# Patient Record
Sex: MALE | Race: WHITE | Employment: OTHER | URBAN - NONMETROPOLITAN AREA
[De-identification: names, ages, dates, MRNs, and addresses within clinical notes are randomized per-mention and may not be internally consistent; named-entity substitution may affect disease eponyms.]

---

## 2020-11-25 ENCOUNTER — OFFICE VISIT (OUTPATIENT)
Dept: ENT CLINIC | Age: 75
End: 2020-11-25
Payer: MEDICARE

## 2020-11-25 VITALS
DIASTOLIC BLOOD PRESSURE: 82 MMHG | RESPIRATION RATE: 16 BRPM | SYSTOLIC BLOOD PRESSURE: 130 MMHG | HEART RATE: 80 BPM | WEIGHT: 203 LBS | TEMPERATURE: 97.3 F | BODY MASS INDEX: 32.62 KG/M2 | HEIGHT: 66 IN

## 2020-11-25 PROBLEM — R42 VERTIGO: Status: ACTIVE | Noted: 2020-11-25

## 2020-11-25 PROBLEM — Z95.1 PRESENCE OF AORTOCORONARY BYPASS GRAFT: Status: ACTIVE | Noted: 2017-10-20

## 2020-11-25 PROBLEM — E11.9 TYPE 2 DIABETES MELLITUS WITHOUT COMPLICATIONS (HCC): Status: ACTIVE | Noted: 2017-10-20

## 2020-11-25 PROBLEM — S01.93XA GUNSHOT WOUND OF HEAD: Status: ACTIVE | Noted: 2020-11-25

## 2020-11-25 PROBLEM — Z95.1 S/P CABG X 4: Status: ACTIVE | Noted: 2017-11-21

## 2020-11-25 PROBLEM — C44.91 BASAL CELL CARCINOMA OF SKIN: Status: ACTIVE | Noted: 2018-10-23
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
## 2020-11-25 PROBLEM — I21.19 ST ELEVATION MYOCARDIAL INFARCTION (STEMI) OF INFERIOR WALL, SUBSEQUENT EPISODE OF CARE (HCC): Status: ACTIVE | Noted: 2018-08-23

## 2020-11-25 PROBLEM — I25.10 CORONARY ARTERY DISEASE INVOLVING NATIVE CORONARY ARTERY OF NATIVE HEART WITHOUT ANGINA PECTORIS: Status: ACTIVE | Noted: 2017-10-02

## 2020-11-25 PROBLEM — I25.2 OLD INFERIOR WALL MYOCARDIAL INFARCTION: Status: ACTIVE | Noted: 2018-10-23

## 2020-11-25 PROBLEM — H81.90 VESTIBULOPATHY: Status: ACTIVE | Noted: 2020-11-25

## 2020-11-25 PROBLEM — Z77.122 EXPOSURE TO NOISE: Status: ACTIVE | Noted: 2020-11-25

## 2020-11-25 PROBLEM — H55.00 NYSTAGMUS: Status: ACTIVE | Noted: 2020-11-25

## 2020-11-25 PROBLEM — J44.9 OTHER SPECIFIED CHRONIC OBSTRUCTIVE AIRWAYS DISEASE: Status: ACTIVE | Noted: 2017-10-16

## 2020-11-25 PROBLEM — D64.9 ANEMIA: Status: ACTIVE | Noted: 2017-10-11

## 2020-11-25 PROBLEM — E66.9 OBESITY (BMI 30.0-34.9): Status: ACTIVE | Noted: 2017-10-02

## 2020-11-25 PROBLEM — R05.9 COUGH: Status: ACTIVE | Noted: 2017-10-12

## 2020-11-25 PROCEDURE — 1036F TOBACCO NON-USER: CPT | Performed by: OTOLARYNGOLOGY

## 2020-11-25 PROCEDURE — 4040F PNEUMOC VAC/ADMIN/RCVD: CPT | Performed by: OTOLARYNGOLOGY

## 2020-11-25 PROCEDURE — G8484 FLU IMMUNIZE NO ADMIN: HCPCS | Performed by: OTOLARYNGOLOGY

## 2020-11-25 PROCEDURE — 99203 OFFICE O/P NEW LOW 30 MIN: CPT | Performed by: OTOLARYNGOLOGY

## 2020-11-25 PROCEDURE — G8417 CALC BMI ABV UP PARAM F/U: HCPCS | Performed by: OTOLARYNGOLOGY

## 2020-11-25 PROCEDURE — 3017F COLORECTAL CA SCREEN DOC REV: CPT | Performed by: OTOLARYNGOLOGY

## 2020-11-25 PROCEDURE — 1123F ACP DISCUSS/DSCN MKR DOCD: CPT | Performed by: OTOLARYNGOLOGY

## 2020-11-25 PROCEDURE — G8427 DOCREV CUR MEDS BY ELIG CLIN: HCPCS | Performed by: OTOLARYNGOLOGY

## 2020-11-25 RX ORDER — LANCETS 33 GAUGE
EACH MISCELLANEOUS
COMMUNITY
Start: 2020-11-05

## 2020-11-25 RX ORDER — BLOOD SUGAR DIAGNOSTIC
STRIP MISCELLANEOUS
COMMUNITY
Start: 2020-11-05

## 2020-11-25 RX ORDER — NITROGLYCERIN 0.4 MG/1
0.4 TABLET SUBLINGUAL DAILY
COMMUNITY
Start: 2020-11-11

## 2020-11-25 RX ORDER — CLOPIDOGREL BISULFATE 75 MG/1
75 TABLET ORAL DAILY
COMMUNITY
Start: 2020-11-03

## 2020-11-25 RX ORDER — ASPIRIN 81 MG/1
81 TABLET, CHEWABLE ORAL DAILY
COMMUNITY

## 2020-11-25 RX ORDER — ATORVASTATIN CALCIUM 80 MG/1
80 TABLET, FILM COATED ORAL DAILY
COMMUNITY
Start: 2020-11-03
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
## 2020-12-25 PROBLEM — R05.9 COUGH: Status: RESOLVED | Noted: 2017-10-12 | Resolved: 2020-12-25

## 2020-12-29 ENCOUNTER — HOSPITAL ENCOUNTER (OUTPATIENT)
Dept: AUDIOLOGY | Age: 75
Discharge: HOME | End: 2020-12-29
Payer: MEDICARE

## 2020-12-29 PROCEDURE — 92557 COMPREHENSIVE HEARING TEST: CPT | Performed by: AUDIOLOGIST

## 2020-12-29 PROCEDURE — 92537 CALORIC VSTBLR TEST W/REC: CPT | Performed by: AUDIOLOGIST

## 2020-12-29 PROCEDURE — 92540 BASIC VESTIBULAR EVALUATION: CPT | Performed by: AUDIOLOGIST

## 2020-12-29 PROCEDURE — 92567 TYMPANOMETRY: CPT | Performed by: AUDIOLOGIST
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
## 2021-01-06 ENCOUNTER — OFFICE VISIT (OUTPATIENT)
Dept: ENT CLINIC | Age: 76
End: 2021-01-06
Payer: MEDICARE

## 2021-01-06 VITALS
RESPIRATION RATE: 16 BRPM | DIASTOLIC BLOOD PRESSURE: 64 MMHG | HEART RATE: 64 BPM | SYSTOLIC BLOOD PRESSURE: 126 MMHG | WEIGHT: 213.7 LBS | BODY MASS INDEX: 34.49 KG/M2 | TEMPERATURE: 97.2 F

## 2021-01-06 DIAGNOSIS — S01.93XS GUNSHOT WOUND OF HEAD, SEQUELA: ICD-10-CM

## 2021-01-06 DIAGNOSIS — H81.90 VESTIBULOPATHY, UNSPECIFIED LATERALITY: Primary | ICD-10-CM
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
## 2021-01-06 DIAGNOSIS — R42 VERTIGO: ICD-10-CM

## 2021-01-06 DIAGNOSIS — H55.00 NYSTAGMUS: ICD-10-CM

## 2021-01-06 PROBLEM — I10 ESSENTIAL HYPERTENSION: Status: ACTIVE | Noted: 2019-06-10

## 2021-01-06 PROBLEM — E11.9 DIABETES MELLITUS (HCC): Status: ACTIVE | Noted: 2021-01-06

## 2021-01-06 PROBLEM — K44.9 HIATAL HERNIA: Status: ACTIVE | Noted: 2021-01-06

## 2021-01-06 PROBLEM — Z77.090 HISTORY OF ASBESTOS EXPOSURE: Status: ACTIVE | Noted: 2018-07-06

## 2021-01-06 PROBLEM — R91.8 MULTIPLE LUNG NODULES: Status: ACTIVE | Noted: 2018-07-06

## 2021-01-06 PROCEDURE — G8484 FLU IMMUNIZE NO ADMIN: HCPCS | Performed by: OTOLARYNGOLOGY

## 2021-01-06 PROCEDURE — 1123F ACP DISCUSS/DSCN MKR DOCD: CPT | Performed by: OTOLARYNGOLOGY

## 2021-01-06 PROCEDURE — 99213 OFFICE O/P EST LOW 20 MIN: CPT | Performed by: OTOLARYNGOLOGY

## 2021-01-06 PROCEDURE — 3017F COLORECTAL CA SCREEN DOC REV: CPT | Performed by: OTOLARYNGOLOGY

## 2021-01-06 PROCEDURE — 1036F TOBACCO NON-USER: CPT | Performed by: OTOLARYNGOLOGY

## 2021-01-06 PROCEDURE — G8417 CALC BMI ABV UP PARAM F/U: HCPCS | Performed by: OTOLARYNGOLOGY

## 2021-01-06 PROCEDURE — G8427 DOCREV CUR MEDS BY ELIG CLIN: HCPCS | Performed by: OTOLARYNGOLOGY

## 2021-01-06 PROCEDURE — 4040F PNEUMOC VAC/ADMIN/RCVD: CPT | Performed by: OTOLARYNGOLOGY
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
## 2021-07-08 ENCOUNTER — TELEPHONE (OUTPATIENT)
Dept: ENT CLINIC | Age: 76
End: 2021-07-08

## 2021-07-08 NOTE — TELEPHONE ENCOUNTER
Quiana Coley called [facility] to cancel his six month follow up appointment with Dr. Eduardo Rangel, scheduled [email or web address] states that he has had episodes since September 2020, and does not believe he needs to see Dr. Eduardo Rangel. Quiana Coley verified that he does not wish to reschedule his appointment.